# Patient Record
Sex: FEMALE | Race: WHITE | NOT HISPANIC OR LATINO | Employment: STUDENT | URBAN - METROPOLITAN AREA
[De-identification: names, ages, dates, MRNs, and addresses within clinical notes are randomized per-mention and may not be internally consistent; named-entity substitution may affect disease eponyms.]

---

## 2024-01-05 ENCOUNTER — OFFICE VISIT (OUTPATIENT)
Dept: OBSTETRICS AND GYNECOLOGY | Facility: CLINIC | Age: 24
End: 2024-01-05
Payer: COMMERCIAL

## 2024-01-05 VITALS
DIASTOLIC BLOOD PRESSURE: 70 MMHG | BODY MASS INDEX: 20.66 KG/M2 | HEIGHT: 64 IN | WEIGHT: 121 LBS | SYSTOLIC BLOOD PRESSURE: 102 MMHG

## 2024-01-05 DIAGNOSIS — Z11.51 ENCOUNTER FOR SCREENING FOR HUMAN PAPILLOMAVIRUS (HPV): ICD-10-CM

## 2024-01-05 DIAGNOSIS — Z01.419 ENCOUNTER FOR WELL WOMAN EXAM WITH ROUTINE GYNECOLOGICAL EXAM: Primary | ICD-10-CM

## 2024-01-05 DIAGNOSIS — Z30.41 ENCOUNTER FOR SURVEILLANCE OF CONTRACEPTIVE PILLS: ICD-10-CM

## 2024-01-05 DIAGNOSIS — Z12.4 SCREENING FOR MALIGNANT NEOPLASM OF CERVIX: ICD-10-CM

## 2024-01-05 PROCEDURE — 99395 PREV VISIT EST AGE 18-39: CPT | Performed by: NURSE PRACTITIONER

## 2024-01-05 PROCEDURE — 1036F TOBACCO NON-USER: CPT | Performed by: NURSE PRACTITIONER

## 2024-01-05 PROCEDURE — 88175 CYTOPATH C/V AUTO FLUID REDO: CPT

## 2024-01-05 RX ORDER — DESOGESTREL AND ETHINYL ESTRADIOL 0.15-0.03
1 KIT ORAL DAILY
Qty: 90 TABLET | Refills: 3 | Status: SHIPPED | OUTPATIENT
Start: 2024-01-05 | End: 2024-01-05 | Stop reason: SDUPTHER

## 2024-01-05 RX ORDER — DESOGESTREL AND ETHINYL ESTRADIOL 0.15-0.03
1 KIT ORAL DAILY
Qty: 90 TABLET | Refills: 3 | Status: SHIPPED | OUTPATIENT
Start: 2024-01-05 | End: 2025-01-04

## 2024-01-05 RX ORDER — DESOGESTREL AND ETHINYL ESTRADIOL 0.15-0.03
1 KIT ORAL DAILY
COMMUNITY
Start: 2023-12-21 | End: 2024-01-05 | Stop reason: SDUPTHER

## 2024-01-05 NOTE — PROGRESS NOTES
"     HPI:   Tashia Swenson is a 23 y.o. who presents today for her annual gynecologic exam without complaints    She has the following concerns; None. Doing well!    GYN HISTORY:  Periods are regular every 28-30 days, lasting 4 days.   Dysmenorrhea:none. Cyclic symptoms include none.   No intermenstrual bleeding, spotting, or discharge.    Current contraception: OCP (estrogen/progesterone)      Requests STD testing: no     PAP History   Last pap:   2021 Normal HPV Not done  History of abnormal pap: no  HPV vaccine: yes -    @paphx@    Health Screening  Family history of breast, uterine, ovarian or colon cancer: yes - maternal and paternal grandmothers have a hx of breast cancer.           The patient feels safe at home.         Review of Systems:   Constitutional: no fever and no chills.  Cardiovascular: no chest pain.   Respiratory: no shortness of breath.   Gastrointestinal: no nausea, no abdominal pain and no constipation  Genitourinary: no dysuria, no urinary incontinence, no vaginal dryness, no pelvic pain and no vaginal discharge.   Neurological: no headache.  Psychiatric: no anxiety and no depression.              Objective         /70   Ht 1.62 m (5' 3.78\")   Wt 54.9 kg (121 lb)   LMP 01/02/2024 (Exact Date)   BMI 20.91 kg/m²         Physical Exam:   Constitutional: Alert and in no acute distress. Well developed, well nourished.      Neck: No neck asymmetry. Supple. Thyroid not enlarged and there were no palpable thyroid nodules.      Cardiovascular: Heart rate and rhythm were normal, normal S1 and S2, no gallops, and no murmurs.      Pulmonary: No respiratory distress. Clear bilateral breath sounds.      Chest: Breasts: deferred per pt     Abdomen: Soft nontender; no abdominal mass palpated. Normal bowel sounds. No organomegaly.      Genitourinary:   - External genitalia: Normal.   - Palpation of lymph nodes in groin: No inguinal lymphadenopathy.   - Bartholin's Urethral and Skenes Glands: Normal. "   - Urethra: Normal.    -Bladder: Normal on palpation.   - Vagina: Normal.   - Cervix: Normal.   - Uterus: Normal. Right Adnexa/parametria: Normal. Left Adnexa/parametria: Normal.   - Perianal Area: Normal.      Skin: Normal skin color and pigmentation, normal skin turgor, and no rash     Psychiatric: Alert and oriented x 3. Affect normal to patient baseline. Mood: Appropriate.            Assessment/Plan       Diagnoses and all orders for this visit:  Encounter for well woman exam with routine gynecological exam  Here for well woman exam. She is doing well. No concerns today. She needs refills of her ocp.   Encounter for surveillance of contraceptive pills  -     Apri 0.15-0.03 mg tablet; Take 1 tablet by mouth once daily. as directed  Screening for malignant neoplasm of cervix  Encounter for screening for human papillomavirus (HPV)  Thin prep.   Follow-up annually, sooner if needed.       NOEMI Browning-CNP

## 2024-01-05 NOTE — TELEPHONE ENCOUNTER
Pt requesting Apri be sent to a different pharmacy please   Medication pended for Saumya Harper CNP

## 2024-01-15 LAB
CYTOLOGY CMNT CVX/VAG CYTO-IMP: NORMAL
LAB AP HPV HR: NORMAL
LABORATORY COMMENT REPORT: NORMAL
LMP START DATE: NORMAL
PATH REPORT.TOTAL CANCER: NORMAL

## 2024-12-19 DIAGNOSIS — Z30.41 ENCOUNTER FOR SURVEILLANCE OF CONTRACEPTIVE PILLS: ICD-10-CM

## 2024-12-19 RX ORDER — DESOGESTREL AND ETHINYL ESTRADIOL 0.15-0.03
1 KIT ORAL DAILY
Qty: 84 TABLET | Refills: 0 | Status: SHIPPED | OUTPATIENT
Start: 2024-12-19 | End: 2025-12-19

## 2024-12-19 NOTE — TELEPHONE ENCOUNTER
Reviewing  EMR  Last Annual Exam:  01/05/2024  Negative Pap 2024  Annual is scheduled 01/09/2025  3 month supply of medication pended for Saumya Harper CNP

## 2025-01-09 ENCOUNTER — APPOINTMENT (OUTPATIENT)
Dept: OBSTETRICS AND GYNECOLOGY | Facility: CLINIC | Age: 25
End: 2025-01-09
Payer: COMMERCIAL

## 2025-01-09 VITALS — SYSTOLIC BLOOD PRESSURE: 110 MMHG | DIASTOLIC BLOOD PRESSURE: 60 MMHG

## 2025-01-09 DIAGNOSIS — Z30.41 ENCOUNTER FOR SURVEILLANCE OF CONTRACEPTIVE PILLS: ICD-10-CM

## 2025-01-09 DIAGNOSIS — Z01.419 ENCOUNTER FOR WELL WOMAN EXAM WITH ROUTINE GYNECOLOGICAL EXAM: Primary | ICD-10-CM

## 2025-01-09 PROCEDURE — 99395 PREV VISIT EST AGE 18-39: CPT | Performed by: NURSE PRACTITIONER

## 2025-01-09 PROCEDURE — 1036F TOBACCO NON-USER: CPT | Performed by: NURSE PRACTITIONER

## 2025-01-09 RX ORDER — DESOGESTREL AND ETHINYL ESTRADIOL 0.15-0.03
1 KIT ORAL DAILY
Qty: 84 TABLET | Refills: 3 | Status: SHIPPED | OUTPATIENT
Start: 2025-01-09 | End: 2026-01-09

## 2025-01-09 NOTE — PROGRESS NOTES
HPI:   Tashia Swenson is a 24 y.o. who presents today for her annual gynecologic exam without complaints    She has the following concerns: has a swollen lymph node in the right groin. She noticed this in June/ July. She was seen at urgent care. [facility] urgent care was not concerned about it. She denies any vaginal symptoms, no pelvic pian. Node has not changed in size since she first noticed it. Sometimes notices it more prominently with her periods. Declines STD testing.     GYN HISTORY:  Periods are regular every 28-30 days, lasting 4 days.   Dysmenorrhea:none. Cyclic symptoms include none.   No intermenstrual bleeding, spotting, or discharge.    Current contraception: OCP (estrogen/progesterone)      Requests STD testing: no     PAP History   Last pap:   2024 Normal HPV Not done  History of abnormal pap: no  HPV vaccine: yes -    @paphx@    Health Screening  Family history of breast, uterine, ovarian or colon cancer: yes - maternal and paternal grandmothers have a hx of breast cancer.           The patient feels safe at home.         Review of Systems:   Constitutional: no fever and no chills.  Cardiovascular: no chest pain.   Respiratory: no shortness of breath.   Gastrointestinal: no nausea, no abdominal pain and no constipation  Genitourinary: no dysuria, no urinary incontinence, no vaginal dryness, no pelvic pain and no vaginal discharge.   Neurological: no headache.  Psychiatric: no anxiety and no depression.              Objective         /60   LMP 12/17/2024         Physical Exam:   Constitutional: Alert and in no acute distress. Well developed, well nourished.      Neck: No neck asymmetry. Supple. Thyroid not enlarged and there were no palpable thyroid nodules.      Cardiovascular: Heart rate and rhythm were normal, normal S1 and S2, no gallops, and no murmurs.      Pulmonary: No respiratory distress. Clear bilateral breath sounds.      Chest: Breasts: deferred per pt     Abdomen: Soft  nontender; no abdominal mass palpated. Normal bowel sounds. No organomegaly.      Genitourinary:   - External genitalia: Normal.   - Palpation of lymph nodes in groin: + inguinal lymphadenopathy, right side x 1 lymph node.   - Bartholin's Urethral and Skenes Glands: Normal.   - Urethra: Normal.    -Bladder: Normal on palpation.   - Vagina: Normal.   - Cervix: Normal.   - Uterus: Normal. Right Adnexa/parametria: Normal. Left Adnexa/parametria: Normal.   - Perianal Area: Normal.      Skin: Normal skin color and pigmentation, normal skin turgor, and no rash     Psychiatric: Alert and oriented x 3. Affect normal to patient baseline. Mood: Appropriate.            Assessment/Plan       Diagnoses and all orders for this visit:  Encounter for well woman exam with routine gynecological exam  Here for well woman exam. She is doing well. PAP is up to date. OCP refills were sent. She has a small inguinal lymph node <5mm on the right side. Discussed further workup with labs/ ultrasound. She will monitor for now. If size or consistency changes she will notify the office. She declines STD testing.   Encounter for surveillance of contraceptive pills  -     Apri 0.15-0.03 mg tablet; Take 1 tablet by mouth once daily. as directed  Follow-up annually, sooner if needed.       NOEMI Browning-CNP

## 2025-08-30 DIAGNOSIS — Z30.41 ENCOUNTER FOR SURVEILLANCE OF CONTRACEPTIVE PILLS: ICD-10-CM

## 2025-09-02 RX ORDER — DESOGESTREL AND ETHINYL ESTRADIOL 0.15-0.03
1 KIT ORAL DAILY
Qty: 84 TABLET | Refills: 1 | Status: SHIPPED | OUTPATIENT
Start: 2025-09-02 | End: 2026-09-02

## 2026-01-15 ENCOUNTER — APPOINTMENT (OUTPATIENT)
Dept: OBSTETRICS AND GYNECOLOGY | Facility: CLINIC | Age: 26
End: 2026-01-15
Payer: COMMERCIAL